# Patient Record
(demographics unavailable — no encounter records)

---

## 2024-10-24 NOTE — HISTORY OF PRESENT ILLNESS
[FreeTextEntry8] : Pt reports fatigue. Ongoing for a while. Pt is very busy at work. Exercises regularly. Had b/w in 04/2024, Vitamin D very low at that time.  Has been taking OTC Vit D 1-2x/wk but is not very consistent. Pt denies CP, SOB, recent illness, rashes.  Pt is considering having a facelift, would be 11/5 or 11/19. Needs clearance prior to surgery. Surgeon:  Dr. Guzman. Requires CBC, UA, EKG.

## 2024-10-24 NOTE — ASSESSMENT
[FreeTextEntry1] : Patient is a 64yo female who presents to the office complaining of persistent fatigue, requesting b/w today.  Pt is considering facelift surgery with Dr. Guzman as well and would need clearance for the procedure (would be 11/5 or 11/19).  Labs drawn in office. EKG performed in office NSR, no acute ST/T changes, no arrhythmias.  Pt denies cardiac complaints. Requires UA for clearance, sent to lab.  Call the office or go to the ED immediately if you develop new, worsening or concerning symptoms including high fever, severe headache/worst headache of your life, confusion, dizziness/lightheadedness, loss of consciousness, severe chest pain, difficulty breathing, shortness of breath, severe abdominal pain, excessive vomiting/diarrhea, inability to feel/move the extremities, or any other concerning symptoms.

## 2024-10-24 NOTE — REVIEW OF SYSTEMS
[Fatigue] : fatigue [Negative] : Heme/Lymph [Fever] : no fever [Chills] : no chills [Recent Change In Weight] : ~T no recent weight change

## 2024-10-24 NOTE — ADDENDUM
[FreeTextEntry1] : 10/24/24 1351 Pt has decided to proceed with cosmetic surgery (facelift).  Labs WNL.  EKG NSR, no acute ST/T changes, no arrhythmias.  Pt optimized for proposed procedure.   Agree with above. Patient is medically optimized for the proposed procedure. Roel Hill M.D.

## 2024-10-24 NOTE — ASSESSMENT
[FreeTextEntry1] : Patient is a 62yo female who presents to the office complaining of persistent fatigue, requesting b/w today.  Pt is considering facelift surgery with Dr. Guzman as well and would need clearance for the procedure (would be 11/5 or 11/19).  Labs drawn in office. EKG performed in office NSR, no acute ST/T changes, no arrhythmias.  Pt denies cardiac complaints. Requires UA for clearance, sent to lab.  Call the office or go to the ED immediately if you develop new, worsening or concerning symptoms including high fever, severe headache/worst headache of your life, confusion, dizziness/lightheadedness, loss of consciousness, severe chest pain, difficulty breathing, shortness of breath, severe abdominal pain, excessive vomiting/diarrhea, inability to feel/move the extremities, or any other concerning symptoms.

## 2024-10-29 NOTE — HISTORY OF PRESENT ILLNESS
[FreeTextEntry1] : Patient presents for skin examination. [de-identified] : Denies new, changing, bleeding or tender lesions on the skin over the past 6 months.

## 2024-10-29 NOTE — PHYSICAL EXAM
[Alert] : alert [Oriented x 3] : ~L oriented x 3 [Well Nourished] : well nourished [Full Body Skin Exam Performed] : performed [FreeTextEntry3] : A full skin exam was performed including the scalp, face, neck, chest, abdomen, back, buttocks, upper extremities and lower extremities.  The patient declined examination of the breasts and genitalia.   The exam did show the following benign growths: [place] pigmented nevi. Seborrheic keratoses. Lentigines. Cherry angioma.

## 2025-04-29 NOTE — REVIEW OF SYSTEMS
[Hoarseness] : hoarseness [Nasal Discharge] : nasal discharge [Sore Throat] : sore throat [Postnasal Drip] : postnasal drip [Cough] : cough [Negative] : Heme/Lymph [Earache] : no earache [Shortness Of Breath] : no shortness of breath [Wheezing] : no wheezing

## 2025-04-29 NOTE — ASSESSMENT
[FreeTextEntry1] : Patient is a 65yo female who presents to the office complaining of viral URI symptoms x4 days.  URI, cough - No signs/symptoms of acute bacterial infection. - Supportive care discussed. - COVID negative. - Call the office or go to the ED immediately if you develop new, worsening or concerning symptoms including high fever, severe headache/worst headache of your life, confusion, dizziness/lightheadedness, loss of consciousness, severe chest pain, difficulty breathing, shortness of breath, severe abdominal pain, excessive vomiting/diarrhea, inability to feel/move the extremities, or any other concerning symptoms.

## 2025-04-29 NOTE — HISTORY OF PRESENT ILLNESS
[FreeTextEntry8] : Pt reports NC, cough, hoarseness, sore throat, HA that started 4 days ago. Cough is non-productive. Denies fevers. Still able to exercise. Has hx allergies. Not taking anything for symptoms. +COVID exposures.

## 2025-04-29 NOTE — PHYSICAL EXAM
[Normal Outer Ear/Nose] : the outer ears and nose were normal in appearance [No Edema] : there was no peripheral edema [Normal] : no rash [Coordination Grossly Intact] : coordination grossly intact [No Focal Deficits] : no focal deficits [Normal Gait] : normal gait [Normal Affect] : the affect was normal [Normal Insight/Judgement] : insight and judgment were intact [de-identified] : fluid behind bilateral TMs; nasal mucosa slightly edematous/erythematous with clear drainage; PND, mild erythema, no exudates.